# Patient Record
(demographics unavailable — no encounter records)

---

## 2025-07-28 NOTE — ASSESSMENT
[FreeTextEntry1] : Obtained and reviewed Spirometry and NiOx results with patient today.  Spirometry was stable compared to baseline and NiOx was remarkable for active inflammation.   Dr. Stokes reviewed with Stiven his CXR dated 5/29/25 in office today. The study shows no focal consolidation. Mild hyperinflation.   Dr. Stokes's recommendation: - Advised patient to proceed with a non-contrast lung CT scan to further evaluate Stiven's lung parenchyma and rule out emphysema before his next follow up.  - Start Arnuity 200 mcg/act inhaler, inhale 1 puff daily for active asthma.  - Return for pulmonary follow up in 1 month.

## 2025-07-28 NOTE — CONSULT LETTER
[Dear  ___] : Dear  [unfilled], [Courtesy Letter:] : I had the pleasure of seeing your patient, [unfilled], in my office today. [Please see my note below.] : Please see my note below. [Consult Closing:] : Thank you very much for allowing me to participate in the care of this patient.  If you have any questions, please do not hesitate to contact me. [Sincerely,] : Sincerely, [FreeTextEntry3] : Dr. Sury Stokes Quality 110: Preventive Care And Screening: Influenza Immunization: Influenza immunization was not ordered or administered, reason not given Quality 130: Documentation Of Current Medications In The Medical Record: Current Medications Documented Quality 111:Pneumonia Vaccination Status For Older Adults: Pneumococcal Vaccination not Administered or Previously Received, Reason not Otherwise Specified Detail Level: Detailed

## 2025-07-28 NOTE — PROCEDURE
[FreeTextEntry1] : Pulmonary Function Test performed today, 07/28/2025, in my office:  Spirometry: Within normal limits.   ______________________________________________________________ Exhaled Nitric Oxide             Final  No Documents Attached    	Test  	Result  	Flag	Reference	Goal  	Exhaled Nitric Oxide	51	 	 	  Ordered by: QUINTON SOLIMAN       Collected/Examined: 28-Jul-2025 09:32 am       Verified by: QUINTON SOLIMAN 28-Jul-2025 09:33 am       Result Communication: No patient communication needed at this time; Stage: Final       Performed at: In Office       Performed by: QUINTON SOLIMAN       Resulted: 28-Jul-2025 09:32 am       Last Updated: 28-Jul-2025 09:33 am       Accession: [id number]

## 2025-07-28 NOTE — ADDENDUM
[FreeTextEntry1] : This note was written by Oksana Alexander on 07/28/2025 acting as medical scribe for Dr. Sury Stokes. I, Dr. Sury Stokes, have read and attest that all the information, medical decision making and discharge instructions within are true and accurate.

## 2025-07-28 NOTE — HISTORY OF PRESENT ILLNESS
[Never] : never [TextBox_4] : EAGLE NYE is a 60 year-old male who presents to the office today for initial pulmonary evaluation. Patient presents via the kind courtesy of Dr. Rico Cavazos with regard to an abnormal CXR which showed lung inflammation. Patient reports mild exertional dyspnea but no chest pain or coughing. 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   Patient is a nonsmoker.